# Patient Record
Sex: MALE | Race: WHITE | URBAN - METROPOLITAN AREA
[De-identification: names, ages, dates, MRNs, and addresses within clinical notes are randomized per-mention and may not be internally consistent; named-entity substitution may affect disease eponyms.]

---

## 2024-01-04 ENCOUNTER — HOSPITAL ENCOUNTER (OUTPATIENT)
Dept: TELEMEDICINE | Facility: HOSPITAL | Age: 64
Discharge: HOME | End: 2024-01-04

## 2024-01-04 DIAGNOSIS — I61.8 OTHER RIGHT-SIDED NONTRAUMATIC INTRACEREBRAL HEMORRHAGE: Primary | ICD-10-CM

## 2024-01-04 PROBLEM — I61.9 ICH (INTRACEREBRAL HEMORRHAGE): Status: ACTIVE | Noted: 2024-01-04

## 2024-01-04 PROCEDURE — G0508 CRIT CARE TELEHEA CONSULT 60: HCPCS | Mod: GT,,, | Performed by: STUDENT IN AN ORGANIZED HEALTH CARE EDUCATION/TRAINING PROGRAM

## 2024-01-05 NOTE — SUBJECTIVE & OBJECTIVE
HPI:  63 y.o. male with medical history of HTN  - with admission concerns of focal neurological deficits. And Stroke code called in for the same.     History from patient and medical records review. Acute onset symptoms of LUE, LLE numbness with associated weakness and slurred speech. No compressive neuropathy pattern of presentation. No other focal motor or sensory or cranial nerve deficits. No similar events in the past. Otherwise no history of strokes, seizures or migraines.      Images personally reviewed and interpreted:  Study: Head CT  Study Interpretation: ED doc interpretations / images through video - right thalamocapsular territory ICH with no IVH     Assessment and plan:    Right thalamocapsular ICH - suspect HTN etiology.     Transfer to nearest neuro critical care / neurosurgery capable center for further monitoring and evaluation   - SBP goals < 140   - eunatremia / euglycemia / euthermia goals  - Hold off antiplatelets      - MRI brain wo contrast / CTA head and neck for further evaluation     - target LDL < 70    - HTN goals long term < 140/90  - Echo f/u    - PT/OT recs - discharge planning     - F/u PCP for risk factor modification monitoring  -  on DASH diet; exercise and lifestyle modifications    Provide stroke counseling provided during the visit - Identification of signs; emergency action and ER attention; Risk factor control, optimization for secondary stroke prevention; Compliance to medications     Lytics recommendation: Thrombolytic therapy not recommended due to Hemorrhage on CT   Thrombectomy recommendation: No; at this time symptoms not suggestive of large vessel occlusion  Placement recommendation: transfer to nearest appropriate facility

## 2024-01-05 NOTE — TELEMEDICINE CONSULT
[facility]  Vascular Neurology  Comprehensive Stroke Center  TeleVascular Neurology Acute Consultation Note        Consult Information  Consults    Consulting Provider: BETY BETHEA   Current Providers  No providers found    Patient Location: [facility] Emergency Department    Spoke hospital nurse at bedside with patient assisting consultant.  Patient information was obtained from patient.       Stroke Documentation  Acute Stroke Times   Last Known Normal Date: 01/04/24  Last Known Normal Time: 1600  Symptom Onset Date: 01/04/24  Symptom Onset Time: 1600  Stroke Team Called Date: 01/04/24  Stroke Team Called Time: 1759  Stroke Team Arrival Date: 01/04/24  Stroke Team Arrival Time: 1807  CT Interpretation Time: 1815  CT completed but images not available for review - spoke to document results:  (Images not uploaded / as per ED doc interpretation)  Thrombolytic Therapy Recommended: No  Thrombectomy Recommended: No    NIH Scale:  1a. Level of Consciousness: 0-->Alert, keenly responsive  1b. LOC Questions: 0-->Answers both questions correctly  1c. LOC Commands: 0-->Performs both tasks correctly  2. Best Gaze: 0-->Normal  3. Visual: 0-->No visual loss  4. Facial Palsy: 1-->Minor paralysis (flattened nasolabial fold, asymmetry on smiling)  5a. Motor Arm, Left: 3-->No effort against gravity, limb falls  5b. Motor Arm, Right: 0-->No drift, limb holds 90 (or 45) degrees for full 10 secs  6a. Motor Leg, Left: 3-->No effort against gravity, leg falls to bed immediately  6b. Motor Leg, Right: 0-->No drift, leg holds 30 degree position for full 5 secs  7. Limb Ataxia: 0-->Absent  8. Sensory: 2-->Severe to total sensory loss, patient is not aware of being touched in the face, arm, and leg  9. Best Language: 0-->No aphasia, normal  10. Dysarthria: 1-->Mild-to-moderate dysarthria, patient slurs at least some words and, at worst, can be understood with  some difficulty  11. Extinction and Inattention (formerly Neglect): 0-->No abnormality  Total (NIH Stroke Scale): 10      Modified Srinivas: Score: 1  Wilmar Coma Scale: 15   ABCD2 Score:    [id number] Score:    HAS -BLED Score:    ICH Score:    Hunt & Arredondo Classification:      There were no vitals taken for this visit.    Van Negative    Medical Decision Making  HPI:  63 y.o. male with medical history of HTN  - with admission concerns of focal neurological deficits. And Stroke code called in for the same.     History from patient and medical records review. Acute onset symptoms of LUE, LLE numbness with associated weakness and slurred speech. No compressive neuropathy pattern of presentation. No other focal motor or sensory or cranial nerve deficits. No similar events in the past. Otherwise no history of strokes, seizures or migraines.      Images personally reviewed and interpreted:  Study: Head CT  Study Interpretation: ED doc interpretations / images through video - right thalamocapsular territory ICH with no IVH     Assessment and plan:    Right thalamocapsular ICH - suspect HTN etiology.     Transfer to nearest neuro critical care / neurosurgery capable center for further monitoring and evaluation   - SBP goals < 140   - eunatremia / euglycemia / euthermia goals  - Hold off antiplatelets      - MRI brain wo contrast / CTA head and neck for further evaluation     - target LDL < 70    - HTN goals long term < 140/90  - Echo f/u    - PT/OT recs - discharge planning     - F/u PCP for risk factor modification monitoring  -  on DASH diet; exercise and lifestyle modifications    Provide stroke counseling provided during the visit - Identification of signs; emergency action and ER attention; Risk factor control, optimization for secondary stroke prevention; Compliance to medications     Lytics recommendation: Thrombolytic therapy not recommended due to Hemorrhage on CT   Thrombectomy recommendation: No; at this  time symptoms not suggestive of large vessel occlusion  Placement recommendation: transfer to nearest appropriate facility        ROS  Physical Exam  No past medical history on file.  No past surgical history on file.  No family history on file.    Diagnoses  Problem Noted   Ich (Intracerebral Hemorrhage) 1/4/2024       Yovanny Wakefield MD      Emergent/Acute neurological consultation requested by spoke provider due to critical concerns for possible cerebrovascular event that could result in permanent loss of neurologic/bodily function, severe disability or death of this patient.  Immediate/timely evaluation by a highly prepared expert is paramount for optimal outcomes  High risk for neurological deterioration if not properly diagnosed  High risk for neurological deterioration if not treated promplty/as soon as possible  Complex diagnostic evaluation may be required (advanced imaging)  High risk treatment options (thrombolytics and/or thrombectomy)    Patient care was coordinated with spoke provider, including but not limted to    Discussing likely diagnosis/etiology of symptoms  Making recommendations for further diagnostic studies  Making recommendations for intravenous thrombolytics or other advanced therapies  Making recommendations for disposition (admission/transfer for higher level of care)